# Patient Record
Sex: FEMALE | Race: WHITE | NOT HISPANIC OR LATINO | Employment: OTHER | URBAN - METROPOLITAN AREA
[De-identification: names, ages, dates, MRNs, and addresses within clinical notes are randomized per-mention and may not be internally consistent; named-entity substitution may affect disease eponyms.]

---

## 2023-08-29 PROBLEM — K21.9 GERD (GASTROESOPHAGEAL REFLUX DISEASE): Status: ACTIVE | Noted: 2023-08-29

## 2023-08-29 PROBLEM — K90.0 CELIAC DISEASE (HHS-HCC): Status: ACTIVE | Noted: 2023-08-29

## 2023-08-29 PROBLEM — F32.A DEPRESSIVE DISORDER: Status: ACTIVE | Noted: 2023-08-29

## 2023-08-29 PROBLEM — E06.3 AUTOIMMUNE THYROIDITIS: Status: ACTIVE | Noted: 2023-08-29

## 2023-08-29 PROBLEM — N18.32 CHRONIC KIDNEY DISEASE, STAGE 3B (MULTI): Status: ACTIVE | Noted: 2023-08-29

## 2023-08-29 PROBLEM — M19.90 OSTEOARTHROSIS: Status: ACTIVE | Noted: 2023-08-29

## 2023-08-29 PROBLEM — G62.9 PERIPHERAL NEUROPATHY: Status: ACTIVE | Noted: 2023-08-29

## 2023-08-29 PROBLEM — E10.22 TYPE 1 DIABETES MELLITUS WITH DIABETIC CHRONIC KIDNEY DISEASE (MULTI): Status: ACTIVE | Noted: 2023-08-29

## 2023-08-29 PROBLEM — D64.9 ANEMIA: Status: ACTIVE | Noted: 2023-08-29

## 2023-08-29 PROBLEM — K31.9 DISORDER OF FUNCTION OF STOMACH: Status: ACTIVE | Noted: 2023-08-29

## 2023-08-29 PROBLEM — E03.8 OTHER SPECIFIED HYPOTHYROIDISM: Status: ACTIVE | Noted: 2023-08-29

## 2023-08-29 PROBLEM — K52.9 COLITIS: Status: ACTIVE | Noted: 2023-08-29

## 2023-08-29 PROBLEM — I10 ESSENTIAL HYPERTENSION: Status: ACTIVE | Noted: 2023-08-29

## 2023-08-29 PROBLEM — M71.20 SYNOVIAL CYST OF POPLITEAL SPACE: Status: ACTIVE | Noted: 2023-08-29

## 2023-08-29 PROBLEM — E78.5 HYPERLIPIDEMIA: Status: ACTIVE | Noted: 2023-08-29

## 2023-08-29 PROBLEM — G25.81 RESTLESS LEG SYNDROME: Status: ACTIVE | Noted: 2023-08-29

## 2023-08-29 RX ORDER — INSULIN LISPRO 100 [IU]/ML
30 INJECTION, SOLUTION INTRAVENOUS; SUBCUTANEOUS
COMMUNITY
Start: 2022-02-02 | End: 2024-05-07 | Stop reason: SDUPTHER

## 2023-08-29 RX ORDER — NAPROXEN SODIUM 220 MG
TABLET ORAL DAILY
COMMUNITY
Start: 2020-01-27

## 2023-08-29 RX ORDER — GLUCAGON 3 MG/1
POWDER NASAL AS NEEDED
COMMUNITY
Start: 2021-08-23

## 2023-08-29 RX ORDER — NAPROXEN SODIUM 220 MG/1
81 TABLET, FILM COATED ORAL DAILY
COMMUNITY

## 2023-08-29 RX ORDER — LOSARTAN POTASSIUM 50 MG/1
50 TABLET ORAL DAILY
COMMUNITY

## 2023-08-29 RX ORDER — ROPINIROLE 0.5 MG/1
0.5 TABLET, FILM COATED ORAL NIGHTLY
COMMUNITY

## 2023-08-29 RX ORDER — CITALOPRAM 40 MG/1
40 TABLET, FILM COATED ORAL DAILY
COMMUNITY

## 2023-08-29 RX ORDER — ZOLPIDEM TARTRATE 5 MG/1
2.5 TABLET ORAL NIGHTLY PRN
COMMUNITY
Start: 2016-01-04

## 2023-08-29 RX ORDER — ATORVASTATIN CALCIUM 10 MG/1
10 TABLET, FILM COATED ORAL DAILY
COMMUNITY

## 2023-08-29 RX ORDER — DIPHENHYDRAMINE HCL 25 MG
25 CAPSULE ORAL EVERY 6 HOURS PRN
COMMUNITY

## 2023-08-29 RX ORDER — GABAPENTIN 300 MG/1
1 CAPSULE ORAL NIGHTLY
COMMUNITY

## 2023-08-29 RX ORDER — OXYBUTYNIN CHLORIDE 5 MG/1
5 TABLET ORAL DAILY
COMMUNITY

## 2023-08-29 RX ORDER — INSULIN DEGLUDEC 100 U/ML
15 INJECTION, SOLUTION SUBCUTANEOUS EVERY MORNING
COMMUNITY
End: 2024-05-07 | Stop reason: SDUPTHER

## 2023-08-29 RX ORDER — LEVOTHYROXINE SODIUM 88 UG/1
88 TABLET ORAL
COMMUNITY

## 2023-08-29 RX ORDER — PANTOPRAZOLE SODIUM 40 MG/1
40 TABLET, DELAYED RELEASE ORAL 2 TIMES DAILY PRN
COMMUNITY

## 2023-10-24 ENCOUNTER — PHARMACY VISIT (OUTPATIENT)
Dept: PHARMACY | Facility: CLINIC | Age: 83
End: 2023-10-24
Payer: COMMERCIAL

## 2023-12-23 DIAGNOSIS — E10.22 TYPE 1 DIABETES MELLITUS WITH DIABETIC CHRONIC KIDNEY DISEASE, UNSPECIFIED CKD STAGE (MULTI): Primary | ICD-10-CM

## 2023-12-25 RX ORDER — INSULIN DEGLUDEC 100 U/ML
INJECTION, SOLUTION SUBCUTANEOUS
Qty: 15 ML | Refills: 1 | Status: SHIPPED | OUTPATIENT
Start: 2023-12-25 | End: 2024-12-24

## 2024-01-08 DIAGNOSIS — E10.22 TYPE 1 DIABETES MELLITUS WITH DIABETIC CHRONIC KIDNEY DISEASE, UNSPECIFIED CKD STAGE (MULTI): Primary | ICD-10-CM

## 2024-03-29 DIAGNOSIS — E10.22 TYPE 1 DIABETES MELLITUS WITH DIABETIC CHRONIC KIDNEY DISEASE, UNSPECIFIED CKD STAGE (MULTI): Primary | ICD-10-CM

## 2024-03-29 RX ORDER — INSULIN LISPRO 100 [IU]/ML
INJECTION, SOLUTION INTRAVENOUS; SUBCUTANEOUS
Qty: 15 ML | Refills: 5 | Status: SHIPPED | OUTPATIENT
Start: 2024-03-29 | End: 2025-03-29

## 2024-05-07 DIAGNOSIS — E10.22 TYPE 1 DIABETES MELLITUS WITH DIABETIC CHRONIC KIDNEY DISEASE, UNSPECIFIED CKD STAGE (MULTI): Primary | ICD-10-CM

## 2024-05-07 RX ORDER — INSULIN DEGLUDEC 100 U/ML
9 INJECTION, SOLUTION SUBCUTANEOUS EVERY MORNING
Qty: 18 ML | Refills: 1 | Status: SHIPPED | OUTPATIENT
Start: 2024-05-07

## 2024-05-07 RX ORDER — INSULIN LISPRO 100 [IU]/ML
30 INJECTION, SOLUTION INTRAVENOUS; SUBCUTANEOUS
Qty: 30 ML | Refills: 1 | Status: SHIPPED | OUTPATIENT
Start: 2024-05-07

## 2024-06-04 ENCOUNTER — OFFICE VISIT (OUTPATIENT)
Dept: ENDOCRINOLOGY | Facility: CLINIC | Age: 84
End: 2024-06-04
Payer: COMMERCIAL

## 2024-06-04 VITALS
SYSTOLIC BLOOD PRESSURE: 102 MMHG | HEART RATE: 65 BPM | DIASTOLIC BLOOD PRESSURE: 52 MMHG | WEIGHT: 135 LBS | HEIGHT: 62 IN | BODY MASS INDEX: 24.84 KG/M2

## 2024-06-04 DIAGNOSIS — E78.2 MIXED HYPERLIPIDEMIA: ICD-10-CM

## 2024-06-04 DIAGNOSIS — E06.3 AUTOIMMUNE THYROIDITIS: ICD-10-CM

## 2024-06-04 DIAGNOSIS — E10.22 TYPE 1 DIABETES MELLITUS WITH DIABETIC CHRONIC KIDNEY DISEASE, UNSPECIFIED CKD STAGE (MULTI): Primary | ICD-10-CM

## 2024-06-04 DIAGNOSIS — I10 ESSENTIAL HYPERTENSION: ICD-10-CM

## 2024-06-04 LAB — POC HEMOGLOBIN A1C: 10 % (ref 4.2–6.5)

## 2024-06-04 PROCEDURE — 3074F SYST BP LT 130 MM HG: CPT | Performed by: NURSE PRACTITIONER

## 2024-06-04 PROCEDURE — 3078F DIAST BP <80 MM HG: CPT | Performed by: NURSE PRACTITIONER

## 2024-06-04 PROCEDURE — 83036 HEMOGLOBIN GLYCOSYLATED A1C: CPT | Performed by: NURSE PRACTITIONER

## 2024-06-04 PROCEDURE — 1036F TOBACCO NON-USER: CPT | Performed by: NURSE PRACTITIONER

## 2024-06-04 PROCEDURE — 1159F MED LIST DOCD IN RCRD: CPT | Performed by: NURSE PRACTITIONER

## 2024-06-04 PROCEDURE — 95251 CONT GLUC MNTR ANALYSIS I&R: CPT | Performed by: NURSE PRACTITIONER

## 2024-06-04 PROCEDURE — 99214 OFFICE O/P EST MOD 30 MIN: CPT | Performed by: NURSE PRACTITIONER

## 2024-06-04 PROCEDURE — 1126F AMNT PAIN NOTED NONE PRSNT: CPT | Performed by: NURSE PRACTITIONER

## 2024-06-04 ASSESSMENT — PAIN SCALES - GENERAL: PAINLEVEL: 0-NO PAIN

## 2024-06-04 ASSESSMENT — LIFESTYLE VARIABLES
SKIP TO QUESTIONS 9-10: 1
AUDIT-C TOTAL SCORE: 0
HOW OFTEN DO YOU HAVE SIX OR MORE DRINKS ON ONE OCCASION: NEVER
HOW OFTEN DO YOU HAVE A DRINK CONTAINING ALCOHOL: NEVER
HOW MANY STANDARD DRINKS CONTAINING ALCOHOL DO YOU HAVE ON A TYPICAL DAY: PATIENT DOES NOT DRINK

## 2024-06-04 ASSESSMENT — ENCOUNTER SYMPTOMS
OCCASIONAL FEELINGS OF UNSTEADINESS: 0
LOSS OF SENSATION IN FEET: 0
DEPRESSION: 0

## 2024-06-04 ASSESSMENT — PATIENT HEALTH QUESTIONNAIRE - PHQ9
SUM OF ALL RESPONSES TO PHQ9 QUESTIONS 1 & 2: 0
2. FEELING DOWN, DEPRESSED OR HOPELESS: NOT AT ALL
1. LITTLE INTEREST OR PLEASURE IN DOING THINGS: NOT AT ALL

## 2024-06-04 NOTE — PROGRESS NOTES
HPI   Presents for follow up/metabolic management, 82 yo with DX DM Type 2 diagnosed age 42, TYPE 1 diagnosis 2021. History HTN, HLD, Hypothyroidism, Celiac disease, Neuropathy, CKD (GFR 51 Creatinine 1.09). Current A1C 10% was 9.6 %. Following carb controlled diet somewhat and know reasonable carb allowances. Patient able to afford their medications. Patient is exercising, very active throughout the day.         -CGM Currently on insulin checking BS at least 4-6 xs a day adjustments made based on readings/frequent visits to manage.         -INSULIN Tresiba 9 units daily increased to 10 units Humalog base 5 + ISF 50>150         -HTN Losartan at goal BPs         -STATIN Atorvastatin LDL 68    90 day Cata report downloaded and attached, 36% time in target. no lows. Rollercoaster pattern.        Current Outpatient Medications:     aspirin 81 mg chewable tablet, Chew 1 tablet (81 mg) once daily., Disp: , Rfl:     atorvastatin (Lipitor) 10 mg tablet, Take 1 tablet (10 mg) by mouth once daily., Disp: , Rfl:     morgan cit-mag-D3-Zn--man-bor 250- mg-mg-unit tablet, Take 1 tablet by mouth once daily., Disp: , Rfl:     citalopram (CeleXA) 40 mg tablet, Take 1 tablet (40 mg) by mouth once daily., Disp: , Rfl:     diphenhydrAMINE (BENADryl) 25 mg capsule, Take 1 capsule (25 mg) by mouth every 6 hours if needed for itching., Disp: , Rfl:     FreeStyle Cata sensor system kit, Use as instructed, Disp: 1 each, Rfl: 0    gabapentin (Neurontin) 300 mg capsule, Take 1 capsule (300 mg) by mouth once daily at bedtime., Disp: , Rfl:     glucagon (Baqsimi) 3 mg/actuation spray,non-aerosol, Administer into affected nostril(s) if needed.  as directed as needed for severe low blood sugar Nasally, Disp: , Rfl:     insulin degludec (Tresiba FlexTouch U-100) 100 unit/mL (3 mL) injection, INJECT 9 UNITS SUBCUTANEOUS ONCE DAILY IN THE MORNING, Disp: 15 mL, Rfl: 1    insulin degludec (Tresiba FlexTouch U-100) 100 unit/mL (3 mL) injection,  "Inject 9 Units under the skin once daily in the morning., Disp: 18 mL, Rfl: 1    insulin lispro (HumaLOG KwikPen Insulin) 100 unit/mL injection, INJECT UNDER THE SKIN AS DIRECTED BEFORE MEALS UP TO 30 UNITS DAILY, Disp: 15 mL, Rfl: 5    insulin lispro (HumaLOG KwikPen Insulin) 100 unit/mL injection, Inject 30 Units under the skin 3 times a day with meals.  as directed before meals, Disp: 30 mL, Rfl: 1    insulin syringe-needle U-100 31G X 5/16\" 0.5 mL syringe, Inject under the skin once daily.  Use 3 syringes daily to inject insulin DX: E10.65, Disp: , Rfl:     levothyroxine (Synthroid, Levoxyl) 88 mcg tablet, Take 1 tablet (88 mcg) by mouth once daily in the morning. Take before meals. on an empty stomach, Disp: , Rfl:     losartan (Cozaar) 50 mg tablet, Take 1 tablet (50 mg) by mouth once daily., Disp: , Rfl:     MULTIVITAMIN ORAL, Take by mouth once daily. As directed, Disp: , Rfl:     oxybutynin (Ditropan) 5 mg tablet, Take 1 tablet (5 mg) by mouth once daily., Disp: , Rfl:     pantoprazole (Protonix) 40 mg EC tablet, Take 1 tablet (40 mg) by mouth 2 times a day as needed., Disp: , Rfl:     rOPINIRole (Requip) 0.5 mg tablet, Take 1 tablet (0.5 mg) by mouth once daily at bedtime., Disp: , Rfl:     zolpidem (Ambien) 5 mg tablet, Take 0.5 tablets (2.5 mg) by mouth as needed at bedtime for sleep., Disp: , Rfl:       Allergies as of 06/04/2024 - Reviewed 06/04/2024   Allergen Reaction Noted    Lisinopril Cough 08/29/2023    Metformin Other 08/29/2023    Simvastatin Other 08/29/2023         Review of Systems   Cardiology: Lightheadedness-denies.  Chest pain-denies.  Leg edema-denies.  Palpitations-denies.  Respiratory: Cough-denies. Shortness of breath-denies.  Wheezing-denies.  Gastroenterology: Constipation-denies.  Diarrhea-denies.  Heartburn-denies.  Endocrinology: Cold intolerance-denies.  Heat intolerance-denies.  Sweats-denies.  Neurology: Headache-denies.  Tremor-denies.  Neuropathy in " "extremities-denies.  Psychology: Low energy-denies.  Irritability-denies.  Sleep disturbances-denies.      /52   Pulse 65   Ht 1.575 m (5' 2\")   Wt 61.2 kg (135 lb)   BMI 24.69 kg/m²       Labs:  [facility] viewed through care everywhere this visit    Lab Results   Component Value Date    HGBA1C 10.0 (A) 06/04/2024         Physical Exam   General Appearance: pleasant, cooperative, no acute distress  HEENT: no chemosis, no proptosis, no lid lag, no lid retraction  Neck: no lymphadenopathy, no thyromegaly, no dominant thyroid nodules  Heart: no murmurs, regular rate and rhythm, S1 and S2  Lungs: no wheezes, no rhonci, no rales  Extremities: no lower extremity swelling      Assessment/Plan   1. Type 1 diabetes mellitus with diabetic chronic kidney disease, unspecified CKD stage (Multi)  -uncontrolled Type 1 Diabetes,  has been chronically ill for past year  -adjusted basal and bolus dosing, given new scale  INCREASE THE TRESIBA TO 10 UNITS (11 UNITS IF PEN ONLY ALLOWS 2 UNIT DOSING)   Humalog 5 units BEFORE meals to prevent high blood sugars after meal  Scan BEFORE meal  Blood Sugar.................Units  .........................5 units   151-200......................6 units  201-250......................7 units  251-300.......................8 units  301-350.......................9 units  351-400......................10 units  401 or greater..........11 units  -not dosing for snacks  -declined seeing diabetic educator  -reviewed target BS    - POCT glycosylated hemoglobin (Hb A1C) manually resulted    Patient has been using a blood glucose monitor and performing frequent testing, insulin treated with multiple daily injections and the treatment regime requires frequent adjustment based on blood sugars.   The CGM device is/will be used to lower the risk of hypoglycemia.  The CGM device is/will be used to adjust insulin dosing based on glucose data, food intake, activity and glucose trends  Patient is " adherent to diabetic treatment plan and participates in ongoing education and follow up.  Patient is motivated and knowledgeable about use of continuous glucose monitor.     2. Mixed hyperlipidemia  -tolerates statin    3. Essential hypertension  -at target    4. Autoimmune thyroiditis  -euthroid  -no compressive/obstructive neck complaints       Follow Up: 2 months CNP    -labs/tests/notes reviewed  -reviewed and counseled patient on medication monitoring and side effects    Medical Decision Making  Complexity of problem: Chronic illness of diabetes mellitus uncontrolled, progressing  Data analyzed and reviewed: Reviewed prior notes, blood glucose data, labs including HgbA1c, lipids, serum chemistries.  Ordered tests.   Risk of complications and morbidities: Is definite because of use of insulin and risk of hypoglycemia.  Prescription medications reviewed and modifications made.  Compliance assessed.  Addressed social determinants of health including food insecurity.

## 2024-06-04 NOTE — PATIENT INSTRUCTIONS
Increase long acting insulin by 2 units every 3 days for morning blood sugars around 130 or less  Decrease by 2 units every 3 days for morning blood sugars less than 100     INCREASE THE TRESIBA TO 10 UNITS (11 UNITS IF PEN ONLY ALLOWS 2 UNIT DOSING)     Humalog 5 units BEFORE meals to prevent high blood sugars after meal    Add Correction before meals    Scan BEFORE meal  Blood Sugar.................Units  .........................5 units   151-200......................6 units  201-250......................7 units  251-300.......................8 units  301-350.......................9 units  351-400......................10 units  401 or greater..........11 units

## 2024-07-15 ENCOUNTER — TELEPHONE (OUTPATIENT)
Dept: ENDOCRINOLOGY | Facility: CLINIC | Age: 84
End: 2024-07-15
Payer: COMMERCIAL

## 2024-07-15 DIAGNOSIS — E10.22 TYPE 1 DIABETES MELLITUS WITH DIABETIC CHRONIC KIDNEY DISEASE, UNSPECIFIED CKD STAGE (MULTI): ICD-10-CM

## 2024-07-15 NOTE — TELEPHONE ENCOUNTER
Sent patient a letter for her to call back and reschedule her August appointment. (I left two messages)

## 2024-07-19 ENCOUNTER — TELEPHONE (OUTPATIENT)
Dept: ENDOCRINOLOGY | Facility: CLINIC | Age: 84
End: 2024-07-19
Payer: COMMERCIAL

## 2024-07-19 NOTE — TELEPHONE ENCOUNTER
Patient called back to reschedule her appointment from 8/8/2024.      Patient has been rescheduled.     Alea

## 2024-07-23 DIAGNOSIS — E06.3 AUTOIMMUNE THYROIDITIS: Primary | ICD-10-CM

## 2024-07-23 RX ORDER — LEVOTHYROXINE SODIUM 88 UG/1
TABLET ORAL
Qty: 109 TABLET | Refills: 3 | Status: SHIPPED | OUTPATIENT
Start: 2024-07-23

## 2024-08-09 ENCOUNTER — APPOINTMENT (OUTPATIENT)
Dept: ENDOCRINOLOGY | Facility: CLINIC | Age: 84
End: 2024-08-09
Payer: COMMERCIAL

## 2024-09-12 ENCOUNTER — APPOINTMENT (OUTPATIENT)
Dept: ENDOCRINOLOGY | Facility: CLINIC | Age: 84
End: 2024-09-12
Payer: COMMERCIAL

## 2024-09-12 VITALS
SYSTOLIC BLOOD PRESSURE: 121 MMHG | WEIGHT: 136.6 LBS | HEART RATE: 65 BPM | DIASTOLIC BLOOD PRESSURE: 65 MMHG | BODY MASS INDEX: 24.98 KG/M2

## 2024-09-12 DIAGNOSIS — E06.3 AUTOIMMUNE THYROIDITIS: ICD-10-CM

## 2024-09-12 DIAGNOSIS — E10.22 TYPE 1 DIABETES MELLITUS WITH DIABETIC CHRONIC KIDNEY DISEASE, UNSPECIFIED CKD STAGE (MULTI): Primary | ICD-10-CM

## 2024-09-12 DIAGNOSIS — E78.2 MIXED HYPERLIPIDEMIA: ICD-10-CM

## 2024-09-12 DIAGNOSIS — I10 ESSENTIAL HYPERTENSION: ICD-10-CM

## 2024-09-12 LAB — POC HEMOGLOBIN A1C: 10.3 % (ref 4.2–6.5)

## 2024-09-12 PROCEDURE — 3078F DIAST BP <80 MM HG: CPT | Performed by: NURSE PRACTITIONER

## 2024-09-12 PROCEDURE — 99213 OFFICE O/P EST LOW 20 MIN: CPT | Performed by: NURSE PRACTITIONER

## 2024-09-12 PROCEDURE — 3074F SYST BP LT 130 MM HG: CPT | Performed by: NURSE PRACTITIONER

## 2024-09-12 PROCEDURE — 83036 HEMOGLOBIN GLYCOSYLATED A1C: CPT | Performed by: NURSE PRACTITIONER

## 2024-09-12 PROCEDURE — 1126F AMNT PAIN NOTED NONE PRSNT: CPT | Performed by: NURSE PRACTITIONER

## 2024-09-12 PROCEDURE — 95251 CONT GLUC MNTR ANALYSIS I&R: CPT | Performed by: NURSE PRACTITIONER

## 2024-09-12 PROCEDURE — 1159F MED LIST DOCD IN RCRD: CPT | Performed by: NURSE PRACTITIONER

## 2024-09-12 RX ORDER — ATORVASTATIN CALCIUM 20 MG/1
20 TABLET, FILM COATED ORAL DAILY
COMMUNITY
Start: 2024-04-05

## 2024-09-12 ASSESSMENT — PAIN SCALES - GENERAL: PAINLEVEL: 0-NO PAIN

## 2024-09-12 ASSESSMENT — ENCOUNTER SYMPTOMS: DEPRESSION: 0

## 2024-09-12 NOTE — PATIENT INSTRUCTIONS
Blood Sugar.................Units  .........................8 units   151-200......................9 units  201-250......................10 units  251-300......................11 units  301-350......................12 units  351-400......................13 units  401 or greater..........14 units

## 2024-09-12 NOTE — PROGRESS NOTES
HPI   Presents for follow up/metabolic management, 85 yo with DX DM Type 2 diagnosed age 42, TYPE 1 diagnosis 2021. History HTN, HLD, Hypothyroidism, Celiac disease, Neuropathy, CKD (GFR 51 Creatinine 1.09). Current A1C 10.3 was 10% Not really following carb controlled diet somewhat and know reasonable carb allowances. Patient able to afford their medications. Patient is exercising, very active throughout the day.         -CGM Currently on insulin checking BS at least 4-6 xs a day adjustments made based on readings/frequent visits to manage.         -INSULIN Tresiba 10 units Humalog base 5 + ISF 50>150         -HTN Losartan at goal BPs         -STATIN Atorvastatin LDL 68    90 day Cata report downloaded and attached, 31% time in target. no lows. Rollercoaster pattern.        Current Outpatient Medications:     atorvastatin (Lipitor) 20 mg tablet, Take 1 tablet (20 mg) by mouth once daily., Disp: , Rfl:     aspirin 81 mg chewable tablet, Chew 1 tablet (81 mg) once daily., Disp: , Rfl:     morgan cit-mag-D3-Zn--man-bor 250- mg-mg-unit tablet, Take 1 tablet by mouth once daily., Disp: , Rfl:     citalopram (CeleXA) 40 mg tablet, Take 1 tablet (40 mg) by mouth once daily., Disp: , Rfl:     diphenhydrAMINE (BENADryl) 25 mg capsule, Take 1 capsule (25 mg) by mouth every 6 hours if needed for itching., Disp: , Rfl:     flash glucose scanning reader misc, Use as instructed, Disp: 1 each, Rfl: 0    FreeStyle Cata sensor system kit, Use as instructed, Disp: 1 each, Rfl: 0    gabapentin (Neurontin) 300 mg capsule, Take 1 capsule (300 mg) by mouth once daily at bedtime., Disp: , Rfl:     glucagon (Baqsimi) 3 mg/actuation spray,non-aerosol, Administer into affected nostril(s) if needed.  as directed as needed for severe low blood sugar Nasally, Disp: , Rfl:     insulin degludec (Tresiba FlexTouch U-100) 100 unit/mL (3 mL) injection, INJECT 9 UNITS SUBCUTANEOUS ONCE DAILY IN THE MORNING, Disp: 15 mL, Rfl: 1    insulin  "degludec (Tresiba FlexTouch U-100) 100 unit/mL (3 mL) injection, Inject 9 Units under the skin once daily in the morning., Disp: 18 mL, Rfl: 1    insulin lispro (HumaLOG KwikPen Insulin) 100 unit/mL injection, INJECT UNDER THE SKIN AS DIRECTED BEFORE MEALS UP TO 30 UNITS DAILY, Disp: 15 mL, Rfl: 5    insulin lispro (HumaLOG KwikPen Insulin) 100 unit/mL injection, Inject 30 Units under the skin 3 times a day with meals.  as directed before meals, Disp: 30 mL, Rfl: 1    insulin syringe-needle U-100 31G X 5/16\" 0.5 mL syringe, Inject under the skin once daily.  Use 3 syringes daily to inject insulin DX: E10.65, Disp: , Rfl:     levothyroxine (Synthroid, Levoxyl) 88 mcg tablet, TAKE 1 TABLET BY MOUTH DAILY AND 1 AND 1/2 TABLETS BY MOUTH ON  SUNDAYS, Disp: 109 tablet, Rfl: 3    losartan (Cozaar) 50 mg tablet, Take 1 tablet (50 mg) by mouth once daily., Disp: , Rfl:     MULTIVITAMIN ORAL, Take by mouth once daily. As directed, Disp: , Rfl:     oxybutynin (Ditropan) 5 mg tablet, Take 1 tablet (5 mg) by mouth once daily., Disp: , Rfl:     pantoprazole (Protonix) 40 mg EC tablet, Take 1 tablet (40 mg) by mouth 2 times a day as needed., Disp: , Rfl:     rOPINIRole (Requip) 0.5 mg tablet, Take 1 tablet (0.5 mg) by mouth once daily at bedtime., Disp: , Rfl:     zolpidem (Ambien) 5 mg tablet, Take 0.5 tablets (2.5 mg) by mouth as needed at bedtime for sleep., Disp: , Rfl:       Allergies as of 09/12/2024 - Reviewed 09/12/2024   Allergen Reaction Noted    Lisinopril Cough 08/29/2023    Metformin Other and GI Upset 05/16/2016    Simvastatin Other and Myalgia 05/16/2016         Review of Systems   Cardiology: Lightheadedness-denies.  Chest pain-denies.  Leg edema-denies.  Palpitations-denies.  Respiratory: Cough-denies. Shortness of breath-denies.  Wheezing-denies.  Gastroenterology: Constipation-denies.  Diarrhea-denies.  Heartburn-denies.  Endocrinology: Cold intolerance-denies.  Heat intolerance-denies.  " Sweats-denies.  Neurology: Headache-denies.  Tremor-denies.  Neuropathy in extremities-denies.  Psychology: Low energy-denies.  Irritability-denies.  Sleep disturbances-denies.      /65 (BP Location: Left arm, Patient Position: Sitting, BP Cuff Size: Adult)   Pulse 65   Wt 62 kg (136 lb 9.6 oz)   LMP  (LMP Unknown)   BMI 24.98 kg/m²       Labs:  [facility] viewed through care everywhere this visit    Lab Results   Component Value Date    HGBA1C 10.3 (A) 09/12/2024         Physical Exam   General Appearance: pleasant, cooperative, no acute distress  HEENT: no chemosis, no proptosis, no lid lag, no lid retraction  Neck: no lymphadenopathy, no thyromegaly, no dominant thyroid nodules  Heart: no murmurs, regular rate and rhythm, S1 and S2  Lungs: no wheezes, no rhonci, no rales  Extremities: no lower extremity swelling      Assessment/Plan   1. Type 1 diabetes mellitus with diabetic chronic kidney disease, unspecified CKD stage (Multi)  -uncontrolled Type 1 Diabetes,  has been chronically ill for past year, passed away in June 2024 (missed follow up appointments)  -reinforced pre-bolusing  -reviewed target BS  -reviewed carbs (she is not carb counting)  -ISF 50>150 +1 base of 8  -meet with educator next    Patient has been using a blood glucose monitor and performing frequent testing, insulin treated with multiple daily injections and the treatment regime requires frequent adjustment based on blood sugars.   The CGM device is/will be used to lower the risk of hypoglycemia.  The CGM device is/will be used to adjust insulin dosing based on glucose data, food intake, activity and glucose trends  Patient is adherent to diabetic treatment plan and participates in ongoing education and follow up.  Patient is motivated and knowledgeable about use of continuous glucose monitor.     2. Mixed hyperlipidemia  -tolerates statin    3. Essential hypertension  -at target    4. Autoimmune thyroiditis  -euthroid  -no  compressive/obstructive neck complaints       Follow Up: 2 months     -labs/tests/notes reviewed  -reviewed and counseled patient on medication monitoring and side effects    Medical Decision Making  Complexity of problem: Chronic illness of diabetes mellitus uncontrolled, progressing  Data analyzed and reviewed: Reviewed prior notes, blood glucose data, labs including HgbA1c, lipids, serum chemistries.  Ordered tests.   Risk of complications and morbidities: Is definite because of use of insulin and risk of hypoglycemia.  Prescription medications reviewed and modifications made.  Compliance assessed.  Addressed social determinants of health including food insecurity.

## 2024-12-05 ENCOUNTER — TELEPHONE (OUTPATIENT)
Dept: ENDOCRINOLOGY | Facility: CLINIC | Age: 84
End: 2024-12-05

## 2024-12-05 ENCOUNTER — APPOINTMENT (OUTPATIENT)
Dept: ENDOCRINOLOGY | Facility: CLINIC | Age: 84
End: 2024-12-05
Payer: COMMERCIAL

## 2024-12-05 NOTE — TELEPHONE ENCOUNTER
Attempted to talk to Court to let her know that Erika was not going to be in today ;therefore her appt is cancelled for today 12/05/2024. However, her mailbox was full.

## 2024-12-11 DIAGNOSIS — E10.22 TYPE 1 DIABETES MELLITUS WITH DIABETIC CHRONIC KIDNEY DISEASE, UNSPECIFIED CKD STAGE: ICD-10-CM

## 2024-12-11 RX ORDER — INSULIN LISPRO 100 [IU]/ML
INJECTION, SOLUTION INTRAVENOUS; SUBCUTANEOUS
Qty: 15 ML | Refills: 5 | Status: SHIPPED | OUTPATIENT
Start: 2024-12-11 | End: 2025-12-11

## 2024-12-17 ENCOUNTER — TELEPHONE (OUTPATIENT)
Dept: ENDOCRINOLOGY | Facility: CLINIC | Age: 84
End: 2024-12-17

## 2024-12-17 ENCOUNTER — APPOINTMENT (OUTPATIENT)
Dept: ENDOCRINOLOGY | Facility: CLINIC | Age: 84
End: 2024-12-17
Payer: COMMERCIAL

## 2025-01-27 DIAGNOSIS — E10.22 TYPE 1 DIABETES MELLITUS WITH DIABETIC CHRONIC KIDNEY DISEASE, UNSPECIFIED CKD STAGE: ICD-10-CM

## 2025-01-27 RX ORDER — INSULIN DEGLUDEC 100 U/ML
9 INJECTION, SOLUTION SUBCUTANEOUS EVERY MORNING
Qty: 18 ML | Refills: 1 | Status: SHIPPED | OUTPATIENT
Start: 2025-01-27

## 2025-02-13 ENCOUNTER — APPOINTMENT (OUTPATIENT)
Dept: ENDOCRINOLOGY | Facility: CLINIC | Age: 85
End: 2025-02-13
Payer: COMMERCIAL

## 2025-02-20 ENCOUNTER — APPOINTMENT (OUTPATIENT)
Dept: ENDOCRINOLOGY | Facility: CLINIC | Age: 85
End: 2025-02-20
Payer: COMMERCIAL

## 2025-02-20 VITALS
HEIGHT: 63 IN | HEART RATE: 64 BPM | WEIGHT: 141 LBS | SYSTOLIC BLOOD PRESSURE: 112 MMHG | DIASTOLIC BLOOD PRESSURE: 62 MMHG | BODY MASS INDEX: 24.98 KG/M2

## 2025-02-20 DIAGNOSIS — E10.22 TYPE 1 DIABETES MELLITUS WITH DIABETIC CHRONIC KIDNEY DISEASE, UNSPECIFIED CKD STAGE: Primary | ICD-10-CM

## 2025-02-20 DIAGNOSIS — E78.2 MIXED HYPERLIPIDEMIA: ICD-10-CM

## 2025-02-20 DIAGNOSIS — I10 ESSENTIAL HYPERTENSION: ICD-10-CM

## 2025-02-20 DIAGNOSIS — E06.3 AUTOIMMUNE THYROIDITIS: ICD-10-CM

## 2025-02-20 LAB — POC HEMOGLOBIN A1C: 9.4 % (ref 4.2–6.5)

## 2025-02-20 PROCEDURE — 99214 OFFICE O/P EST MOD 30 MIN: CPT | Performed by: INTERNAL MEDICINE

## 2025-02-20 PROCEDURE — 95251 CONT GLUC MNTR ANALYSIS I&R: CPT | Performed by: INTERNAL MEDICINE

## 2025-02-20 PROCEDURE — 83036 HEMOGLOBIN GLYCOSYLATED A1C: CPT | Performed by: INTERNAL MEDICINE

## 2025-02-20 NOTE — PROGRESS NOTES
Subjective   Patient ID: Court Singh is a 84 y.o. female who presents for Diabetes.  HPI  85 yo with DX DM Type 1 (diagnosed type 2 age 42, TYPE 1 diagnosis 2021), HTN, HLD, Hypothyroidism, Celiac disease, Neuropathy, CKD (GFR 51 Creatinine 1.09). Presents for follow up.   Last A1c-10.3%, today 9.4%.     Testing sugars 4+ times a day with Cata 2 reader. Having low sugars <1-2 times a week. Not really following carb controlled diet and somewhat knows reasonable carb allowances. Pt reports high carb breakfast and lunch, more balanced dinner. Patient able to afford their medications. Patient is exercising, very active throughout the day.    Taking Tresiba 10 units once daily and Humalog 8 units with meals plus ISF 50 > 150.     30 day Cata report downloaded and attached - 27% time in target, 0% lows, avg glucose 242, patterns: upper 100's overnight, mid 200's or higher on waking, sugars 300+ after breakfast, 200-300 around lunch, upper 100's before dinner, mid 200's after dinner and before bed.     Taking losartan 50mg daily for htn.   Taking atorvastatin 20mg daily for lipids.   - LDL 68  Taking levothyroxine 88mcg daily.     Reviewed labs from January 2025: CBC, CMP, TSH, urine microalbumin - all unremarkable.     Current Outpatient Medications:     aspirin 81 mg chewable tablet, Chew 1 tablet (81 mg) once daily., Disp: , Rfl:     atorvastatin (Lipitor) 20 mg tablet, Take 1 tablet (20 mg) by mouth once daily., Disp: , Rfl:     morgan cit-mag-D3-Zn--man-bor 250- mg-mg-unit tablet, Take 1 tablet by mouth once daily., Disp: , Rfl:     citalopram (CeleXA) 40 mg tablet, Take 1 tablet (40 mg) by mouth once daily., Disp: , Rfl:     diphenhydrAMINE (BENADryl) 25 mg capsule, Take 1 capsule (25 mg) by mouth every 6 hours if needed for itching., Disp: , Rfl:     flash glucose scanning reader misc, Use as instructed, Disp: 1 each, Rfl: 0    FreeStyle Cata sensor system kit, Use as instructed, Disp: 1 each, Rfl: 0     "gabapentin (Neurontin) 300 mg capsule, Take 1 capsule (300 mg) by mouth once daily at bedtime., Disp: , Rfl:     glucagon (Baqsimi) 3 mg/actuation spray,non-aerosol, Administer into affected nostril(s) if needed.  as directed as needed for severe low blood sugar Nasally, Disp: , Rfl:     insulin degludec (Tresiba FlexTouch U-100) 100 unit/mL (3 mL) injection, INJECT 9 UNITS SUBCUTANEOUS ONCE DAILY IN THE MORNING, Disp: 15 mL, Rfl: 1    insulin degludec (Tresiba FlexTouch U-100) 100 unit/mL (3 mL) injection, Inject 9 Units under the skin once daily in the morning., Disp: 18 mL, Rfl: 1    insulin lispro (HumaLOG KwikPen Insulin) 100 unit/mL injection, Inject 30 Units under the skin 3 times a day with meals.  as directed before meals, Disp: 30 mL, Rfl: 1    insulin lispro (HumaLOG KwikPen Insulin) 100 unit/mL injection, INJECT UNDER THE SKIN AS DIRECTED BEFORE MEALS UP TO 30 UNITS DAILY, Disp: 15 mL, Rfl: 5    insulin syringe-needle U-100 31G X 5/16\" 0.5 mL syringe, Inject under the skin once daily.  Use 3 syringes daily to inject insulin DX: E10.65, Disp: , Rfl:     levothyroxine (Synthroid, Levoxyl) 88 mcg tablet, TAKE 1 TABLET BY MOUTH DAILY AND 1 AND 1/2 TABLETS BY MOUTH ON  SUNDAYS, Disp: 109 tablet, Rfl: 3    losartan (Cozaar) 50 mg tablet, Take 1 tablet (50 mg) by mouth once daily., Disp: , Rfl:     MULTIVITAMIN ORAL, Take by mouth once daily. As directed, Disp: , Rfl:     oxybutynin (Ditropan) 5 mg tablet, Take 1 tablet (5 mg) by mouth once daily., Disp: , Rfl:     pantoprazole (Protonix) 40 mg EC tablet, Take 1 tablet (40 mg) by mouth 2 times a day as needed., Disp: , Rfl:     rOPINIRole (Requip) 0.5 mg tablet, Take 1 tablet (0.5 mg) by mouth once daily at bedtime., Disp: , Rfl:     zolpidem (Ambien) 5 mg tablet, Take 0.5 tablets (2.5 mg) by mouth as needed at bedtime for sleep., Disp: , Rfl:    Allergies   Allergen Reactions    Lisinopril Cough    Metformin Other and GI Upset     stomach upset    Simvastatin " "Other and Myalgia     myalgias       Review of Systems  See HPI.     Objective   /62   Pulse 64   Ht 1.6 m (5' 3\")   Wt 64 kg (141 lb)   LMP  (LMP Unknown)   BMI 24.98 kg/m²     Labs:   Lab Results   Component Value Date    HGBA1C 9.4 (A) 02/20/2025     Lab Results   Component Value Date    CALCIUM 9.2 06/01/2020    AST 27 04/24/2019    ALKPHOS 94 04/24/2019    BILITOT 0.3 04/24/2019    PROT 6.6 04/24/2019    ALBUMIN 3.7 04/24/2019    GLOB 2.9 04/24/2019    AGR 1.3 (L) 04/24/2019     06/01/2020    K 4.9 06/01/2020     06/01/2020    CO2 24 06/01/2020    ANIONGAP 10 06/01/2020    BUN 16 06/01/2020    CREATININE 1.0 06/01/2020    UREACREAUR 16.0 06/01/2020    GLUCOSE 161 (H) 06/01/2020    ALT 19 04/24/2019    EGFR 57 06/01/2020     Lab Results   Component Value Date    WBC 6.1 04/24/2019    NRBC 0 04/24/2019    RBC 4.20 04/24/2019    HGB 13.0 04/24/2019    HCT 40.0 04/24/2019     04/24/2019     No results found for: \"CHOL\", \"TRIG\", \"HDL\", \"LDLCALC\", \"LDLDIRECT\"  Lab Results   Component Value Date    CREATU 79.7 06/01/2020    MICROALBCREA 15.1 06/01/2020     Lab Results   Component Value Date    TSH 2.59 06/23/2023     No results found for: \"ZWAHFPRM43\"  No results found for: \"VITD25\"  No results found for: \"PTH\"  No results found for: \"MG\"    Assessment    1. Type 1 diabetes mellitus with diabetic chronic kidney disease, unspecified CKD stage    2. Autoimmune thyroiditis    3. Essential hypertension    4. Mixed hyperlipidemia        Medical Decision Making  Complexity of problem: Chronic illness of diabetes mellitus uncontrolled, progressing  Data analyzed and reviewed: Reviewed prior notes, blood glucose data, labs including HgbA1c, lipids, serum chemistries.  Ordered tests.   Risk of complications and morbidities: Is definite because of use of insulin and risk of hypoglycemia.  Prescription medications reviewed and modifications made.  Compliance assessed.  Addressed social determinants " of health including food insecurity.    Plan   1. Type 1 diabetes mellitus with diabetic chronic kidney disease, unspecified CKD stage (Primary)  - POCT glycosylated hemoglobin (Hb A1C) manually resulted    -A1c ordered and reviewed.   -CGM data downloaded and reviewed.   -Labs reviewed.     -A1c remains elevated - patient struggling following carb controlled diet, especially with breakfast.   -Challenging to get tight control without carb counting and insulin to carb ratio.   -Discussed possibility of insulin pump to get better control - at this stage of life likely not practical for patient but offered as an option, pt declined.   -Will increase Humalog base with breakfast and dinner - 10-12u with breakfast, 8u with lunch, and 10u with dinner, plus ISF 50 > 150.   -New scale printed for pt today.   -Continue Tresiba 10 units daily.   -Work on getting more protein with breakfast - resources provided today for low carb, higher protein foods.     2. Autoimmune thyroiditis  -Euthyroid.   -Continue current therapy.     3. Essential hypertension  -BP at target today.   -Continue current therapy.     4. Mixed hyperlipidemia  -On statin and tolerating.   -LDL at target.   -Continue current therapy.         -labs/tests/notes reviewed  -reviewed and counseled patient on medication monitoring and side effects    Follow Up: 6 months Dr. Gross     Treatment and plan discussed with Dr. James Gross.   MARCELO Anaya, PharmD, BCACP, CDCES.

## 2025-02-20 NOTE — PATIENT INSTRUCTIONS
Your A1c was 9.4% today.     Continue Tresiba 10 units once daily.     INCREASE your Humalog with meals following the new scale provided today.   -Breakfast starts with 10-12 units, lunch starts with 8 units, and dinner starts with 10 units.     Work on limiting carbs and incorporating more protein into your breakfast meal - meat, eggs, cottage cheese, yogurt, etc.     Follow up with Dr. Gross in 6 months.

## 2025-02-20 NOTE — PROGRESS NOTES
Attestation signed by James Gross MD on 2/20/25 at 9:27 AM.    I, Dr James Gross, have reviewed this progress note, medication list, vital signs, any pertinent lab values, and any CGM data if present with the [facility] and  face to face during this visit today. This note reflects the treatment plan that was made under my direction after reviewing the above mentioned elements while face to face with the patient and CDE.  I personally answered and addressed any questions and concerns the patient had during the visit today.  The CDE entered the data in this note under my direction and I personally reviewed it, signed any lab or medication orders that I instructed to be completed. I am the billing provider for this visit and the level of service was determined by my involvement in the Medical Decision Making Component of this visit while face to face with the patient.

## 2025-05-13 ENCOUNTER — TELEPHONE (OUTPATIENT)
Facility: CLINIC | Age: 85
End: 2025-05-13
Payer: COMMERCIAL

## 2025-05-20 ENCOUNTER — CLINICAL SUPPORT (OUTPATIENT)
Facility: CLINIC | Age: 85
End: 2025-05-20
Payer: MEDICARE

## 2025-05-20 VITALS
SYSTOLIC BLOOD PRESSURE: 129 MMHG | DIASTOLIC BLOOD PRESSURE: 54 MMHG | BODY MASS INDEX: 25.69 KG/M2 | HEART RATE: 72 BPM | WEIGHT: 145 LBS

## 2025-05-20 DIAGNOSIS — I10 ESSENTIAL HYPERTENSION: ICD-10-CM

## 2025-05-20 DIAGNOSIS — E78.2 MIXED HYPERLIPIDEMIA: ICD-10-CM

## 2025-05-20 DIAGNOSIS — E10.22 TYPE 1 DIABETES MELLITUS WITH DIABETIC CHRONIC KIDNEY DISEASE, UNSPECIFIED CKD STAGE: Primary | ICD-10-CM

## 2025-05-20 PROCEDURE — 99214 OFFICE O/P EST MOD 30 MIN: CPT | Performed by: INTERNAL MEDICINE

## 2025-05-20 PROCEDURE — 99214 OFFICE O/P EST MOD 30 MIN: CPT | Performed by: PHARMACIST

## 2025-05-20 PROCEDURE — 95251 CONT GLUC MNTR ANALYSIS I&R: CPT | Performed by: INTERNAL MEDICINE

## 2025-05-20 NOTE — PATIENT INSTRUCTIONS
Try to limit carbs in meals 45g/meal, 15g/snack   Can try frozen meals - Lean Cuisine, Healthy Choice etc.     Avoid cereal in the morning,  IF having cereal increase Humalog to base 15 units + sliding scale as needed     Get Erika a copy of most recent Social Security Benefit Statement for application to  Patient Assistance Program     Scan ketty often, learning what works vs doesn't work for sugars

## 2025-05-20 NOTE — PROGRESS NOTES
HPI     85 yo with DX DM Type 1 (diagnosed type 2 age 42, TYPE 1 diagnosis 2021), HTN, HLD, Hypothyroidism, Celiac disease, Neuropathy, CKD (GFR 51 Creatinine 1.09). Presents for follow up. Last A1c-9.4%, this month 9.8%.     Testing sugars 4+ times a day with Cata 2 reader. Having low sugars <1-2 times a week. Not really following carb controlled diet and somewhat knows reasonable carb allowances. Pt reports high carb breakfast and lunch, more balanced dinner. Patient able to afford their medications. Patient is exercising, very active throughout the day.    Taking Tresiba 10 units once daily and Humalog (10-12)-8-10 with meals plus ISF 50 >150.     30 day Cata report downloaded and attached - 39% time in target, 1% lows, patterns: upper 100's overnight, low 200's or higher on waking, sugars 300+ after breakfast, 200-300 around lunch, upper 100's before dinner, mid 200's after dinner and before bed.     Taking losartan 50mg daily for htn.     Taking atorvastatin 20mg daily for lipids.   - LDL 68    Taking levothyroxine 88mcg daily.     Reviewed labs from January 2025: CBC, CMP, TSH, urine microalbumin - all unremarkable.       Current Medications[1]    Allergies as of 05/20/2025 - Reviewed 05/20/2025   Allergen Reaction Noted    Lisinopril Cough 08/29/2023    Metformin Other and GI Upset 05/16/2016    Simvastatin Other and Myalgia 05/16/2016       /54   Pulse 72   Wt 65.8 kg (145 lb)   LMP  (LMP Unknown)   BMI 25.69 kg/m²     Labs:   Lab Results   Component Value Date    WBC 6.1 04/24/2019    NRBC 0 04/24/2019    RBC 4.20 04/24/2019    HGB 13.0 04/24/2019    HCT 40.0 04/24/2019     04/24/2019     Lab Results   Component Value Date    CALCIUM 9.2 06/01/2020    AST 27 04/24/2019    ALKPHOS 94 04/24/2019    BILITOT 0.3 04/24/2019    PROT 6.6 04/24/2019    ALBUMIN 3.7 04/24/2019    GLOB 2.9 04/24/2019    AGR 1.3 (L) 04/24/2019     06/01/2020    K 4.9 06/01/2020     06/01/2020    CO2 24  "06/01/2020    ANIONGAP 10 06/01/2020    BUN 16 06/01/2020    CREATININE 1.0 06/01/2020    UREACREAUR 16.0 06/01/2020    GLUCOSE 161 (H) 06/01/2020    ALT 19 04/24/2019    EGFR 57 06/01/2020     No results found for: \"CHOL\", \"TRIG\", \"HDL\", \"LDLCALC\"  Lab Results   Component Value Date    MICROALBCREA 15.1 06/01/2020     Lab Results   Component Value Date    TSH 2.59 06/23/2023     No results found for: \"[id number]\"  Lab Results   Component Value Date    HGBA1C 9.8 (H) 05/12/2025         Assessment/Plan   1. Type 1 diabetes mellitus with diabetic chronic kidney disease, unspecified CKD stage (Primary)    -A1c ordered & reviewed  -ccf labs reviewed  -ketty data reviewed, scanned into epic     -comes down to diet, challenging to get tight control without carb counting and insulin to carb ratio  -work on limiting carbs, incorporating more protein especially with breakfast meal (meat/eggs/yogurt/cottage cheese etc.)  -can try frozen meals Lean Cuisine/Healthy Choice as option  -if having cereal incr base to 12-15 units        Patient Assistance Screening (VAF)  Patient verbally reports monthly or yearly income which is less than 400% federal poverty level.  Application for program would be submitted for the following medications: tresiba, humalog  - pt to provide copy of most recent Social Security Benefit Statement to start application process    2. Essential hypertension  -at target on therapy    3. Mixed hyperlipidemia  -on statin, tolerating       Follow up: Aug as previously scheduled     -labs/tests/notes reviewed  -reviewed and counseled patient on medication monitoring and side effects    Treatment and plan discussed with Dr. Gross.  CATA Moore, PharmD, BC-ADM, CDCES.     Medical Decision Making  Complexity of problem: Chronic illness of diabetes mellitus uncontrolled, progressing  Data analyzed and reviewed: Reviewed prior notes, blood glucose data, labs including HgbA1c, lipids, serum chemistries.  Ordered " "tests.   Risk of complications and morbidities: Is definite because of use of insulin and risk of hypoglycemia.  Prescription medications reviewed and modifications made.  Compliance assessed.  Addressed social determinants of health including food insecurity.           [1]   Current Outpatient Medications:     aspirin 81 mg chewable tablet, Chew 1 tablet (81 mg) once daily., Disp: , Rfl:     atorvastatin (Lipitor) 20 mg tablet, Take 1 tablet (20 mg) by mouth once daily., Disp: , Rfl:     morgan cit-mag-D3-Zn--man-bor 250- mg-mg-unit tablet, Take 1 tablet by mouth once daily., Disp: , Rfl:     citalopram (CeleXA) 40 mg tablet, Take 1 tablet (40 mg) by mouth once daily., Disp: , Rfl:     diphenhydrAMINE (BENADryl) 25 mg capsule, Take 1 capsule (25 mg) by mouth every 6 hours if needed for itching., Disp: , Rfl:     flash glucose scanning reader misc, Use as instructed, Disp: 1 each, Rfl: 0    FreeStyle Cata sensor system kit, Use as instructed, Disp: 1 each, Rfl: 0    gabapentin (Neurontin) 300 mg capsule, Take 1 capsule (300 mg) by mouth once daily at bedtime., Disp: , Rfl:     glucagon (Baqsimi) 3 mg/actuation spray,non-aerosol, Administer into affected nostril(s) if needed.  as directed as needed for severe low blood sugar Nasally, Disp: , Rfl:     insulin degludec (Tresiba FlexTouch U-100) 100 unit/mL (3 mL) injection, Inject 9 Units under the skin once daily in the morning., Disp: 18 mL, Rfl: 1    insulin lispro (HumaLOG KwikPen Insulin) 100 unit/mL injection, Inject 30 Units under the skin 3 times a day with meals.  as directed before meals, Disp: 30 mL, Rfl: 1    insulin lispro (HumaLOG KwikPen Insulin) 100 unit/mL injection, INJECT UNDER THE SKIN AS DIRECTED BEFORE MEALS UP TO 30 UNITS DAILY, Disp: 15 mL, Rfl: 5    insulin syringe-needle U-100 31G X 5/16\" 0.5 mL syringe, Inject under the skin once daily.  Use 3 syringes daily to inject insulin DX: E10.65, Disp: , Rfl:     levothyroxine (Synthroid, " Levoxyl) 88 mcg tablet, TAKE 1 TABLET BY MOUTH DAILY AND 1 AND 1/2 TABLETS BY MOUTH ON  SUNDAYS, Disp: 109 tablet, Rfl: 3    losartan (Cozaar) 50 mg tablet, Take 1 tablet (50 mg) by mouth once daily., Disp: , Rfl:     MULTIVITAMIN ORAL, Take by mouth once daily. As directed, Disp: , Rfl:     oxybutynin (Ditropan) 5 mg tablet, Take 1 tablet (5 mg) by mouth once daily., Disp: , Rfl:     pantoprazole (Protonix) 40 mg EC tablet, Take 1 tablet (40 mg) by mouth 2 times a day as needed., Disp: , Rfl:     rOPINIRole (Requip) 0.5 mg tablet, Take 1 tablet (0.5 mg) by mouth once daily at bedtime., Disp: , Rfl:     zolpidem (Ambien) 5 mg tablet, Take 0.5 tablets (2.5 mg) by mouth as needed at bedtime for sleep., Disp: , Rfl:

## 2025-05-20 NOTE — PROGRESS NOTES
Attestation signed by James Gross MD on 5/20/25 at 1:02 PM.    I, Dr James Gross, have reviewed this progress note, medication list, vital signs, any pertinent lab values, and any CGM data if present with the [facility] and  face to face during this visit today. This note reflects the treatment plan that was made under my direction after reviewing the above mentioned elements while face to face with the patient and CDE.  I personally answered and addressed any questions and concerns the patient had during the visit today.  The CDE entered the data in this note under my direction and I personally reviewed it, signed any lab or medication orders that I instructed to be completed. I am the billing provider for this visit and the level of service was determined by my involvement in the Medical Decision Making Component of this visit while face to face with the patient.

## 2025-05-22 ENCOUNTER — TELEMEDICINE (OUTPATIENT)
Dept: PHARMACY | Facility: HOSPITAL | Age: 85
End: 2025-05-22
Payer: MEDICARE

## 2025-05-22 DIAGNOSIS — E10.22 TYPE 1 DIABETES MELLITUS WITH DIABETIC CHRONIC KIDNEY DISEASE, UNSPECIFIED CKD STAGE: ICD-10-CM

## 2025-05-22 RX ORDER — INSULIN DEGLUDEC 100 U/ML
INJECTION, SOLUTION SUBCUTANEOUS
Qty: 15 ML | Refills: 3 | Status: SHIPPED | OUTPATIENT
Start: 2025-05-22

## 2025-05-22 RX ORDER — INSULIN LISPRO 100 [IU]/ML
INJECTION, SOLUTION INTRAVENOUS; SUBCUTANEOUS
Qty: 45 ML | Refills: 3 | Status: SHIPPED | OUTPATIENT
Start: 2025-05-22

## 2025-05-22 NOTE — PROGRESS NOTES
"[facility]      Court Singh a 84 y.o. patient was referred to the Clinical Pharmacy Team for diabetes management.  Presents today via telephone for initial assessment and management.      Referring Provider: James Gross MD     Testing sugars 4+ times a day with Cata 2 reader. Having low sugars <1-2 times a week. Not really following carb controlled diet and somewhat knows reasonable carb allowances. Pt reports high carb breakfast and lunch, more balanced dinner. Patient able to afford their medications. Patient is exercising, very active throughout the day.    Taking Tresiba 10 units once daily and Humalog (10-12)-8-10 with meals plus ISF 50 >150.     30 day Cata report downloaded and attached - 39% time in target, 1% lows, patterns: upper 100's overnight, low 200's or higher on waking, sugars 300+ after breakfast, 200-300 around lunch, upper 100's before dinner, mid 200's after dinner and before bed.     Taking losartan 50mg daily for htn.     Taking atorvastatin 20mg daily for lipids.   - LDL 68    Taking levothyroxine 88mcg daily.     Reviewed labs from January 2025: CBC, CMP, TSH, urine microalbumin - all unremarkable.         Current Medications[1]    Allergies as of 05/22/2025 - Reviewed 05/20/2025   Allergen Reaction Noted    Lisinopril Cough 08/29/2023    Metformin Other and GI Upset 05/16/2016    Simvastatin Other and Myalgia 05/16/2016       Labs:   Lab Results   Component Value Date    CALCIUM 9.2 06/01/2020    AST 27 04/24/2019    ALKPHOS 94 04/24/2019    BILITOT 0.3 04/24/2019    PROT 6.6 04/24/2019    ALBUMIN 3.7 04/24/2019    GLOB 2.9 04/24/2019    AGR 1.3 (L) 04/24/2019     06/01/2020    K 4.9 06/01/2020     06/01/2020    CO2 24 06/01/2020    ANIONGAP 10 06/01/2020    BUN 16 06/01/2020    CREATININE 1.0 06/01/2020    UREACREAUR 16.0 06/01/2020    GLUCOSE 161 (H) 06/01/2020    ALT 19 04/24/2019    EGFR 57 06/01/2020     No results found for: \"CHOL\", \"TRIG\", " "\"HDL\", \"LDLCALC\"  Lab Results   Component Value Date    MICROALBCREA 15.1 06/01/2020     Lab Results   Component Value Date    HGBA1C 9.8 (H) 05/12/2025          Patient Assistance Screening (VAF)  Patient verbally reports monthly or yearly income which is less than 400% federal poverty level.  Application for program has been submitted for the following medications: Tresiba and Humalog  Patient has been informed that program team will be reaching out to them to discuss necessary documentation, instructed to answer phone/return voicemail.   Patient aware this process may take up to 6 weeks.   If approved medication must be filled through [facility] pharmacy and may be picked up or mailed to patient.    PATIENT EDUCATION/DISCUSSION:  - Counseled patient on MOA, expectations, side effects, duration of therapy, contraindications, administration, and monitoring parameters  - Answered all patient questions and concerns    Assessment/Plan   1. Type 1 diabetes mellitus with diabetic chronic kidney disease, unspecified CKD stage      -comes down to diet, challenging to get tight control without carb counting and insulin to carb ratio  -work on limiting carbs, incorporating more protein especially with breakfast meal (meat/eggs/yogurt/cottage cheese etc.)  -can try frozen meals Lean Cuisine/Healthy Choice as option  -if having cereal incr base to 12-15 units       1. Continue all meds under the continuation of care with the referring provider and clinical pharmacy team.  2. Prescription(s) sent to [facility] pharmacy for assistance on authorization and copay. Medication will be mailed to patient.    Follow up as scheduled with Dr. Gross & team in endocrinology office.  Follow up annually with clinical pharmacist for re-enrollment in [facility]    Thank you,   Erika Moore, PharmD, BC-ADM, CDCES     Verbal consent to manage patient's drug therapy was obtained from the patient and/or an individual authorized to act on behalf " "of a patient. They were informed they may decline to participate or withdraw from participation in pharmacy services at any time.       [1]   Current Outpatient Medications:     aspirin 81 mg chewable tablet, Chew 1 tablet (81 mg) once daily., Disp: , Rfl:     atorvastatin (Lipitor) 20 mg tablet, Take 1 tablet (20 mg) by mouth once daily., Disp: , Rfl:     morgan cit-mag-D3-Zn--man-bor 250- mg-mg-unit tablet, Take 1 tablet by mouth once daily., Disp: , Rfl:     citalopram (CeleXA) 40 mg tablet, Take 1 tablet (40 mg) by mouth once daily., Disp: , Rfl:     diphenhydrAMINE (BENADryl) 25 mg capsule, Take 1 capsule (25 mg) by mouth every 6 hours if needed for itching., Disp: , Rfl:     flash glucose scanning reader misc, Use as instructed, Disp: 1 each, Rfl: 0    FreeStyle Cata sensor system kit, Use as instructed, Disp: 1 each, Rfl: 0    gabapentin (Neurontin) 300 mg capsule, Take 1 capsule (300 mg) by mouth once daily at bedtime., Disp: , Rfl:     glucagon (Baqsimi) 3 mg/actuation spray,non-aerosol, Administer into affected nostril(s) if needed.  as directed as needed for severe low blood sugar Nasally, Disp: , Rfl:     insulin degludec (Tresiba FlexTouch U-100) 100 unit/mL (3 mL) injection, Inject 9 Units under the skin once daily in the morning., Disp: 18 mL, Rfl: 1    insulin lispro (HumaLOG KwikPen Insulin) 100 unit/mL injection, Inject 30 Units under the skin 3 times a day with meals.  as directed before meals, Disp: 30 mL, Rfl: 1    insulin lispro (HumaLOG KwikPen Insulin) 100 unit/mL injection, INJECT UNDER THE SKIN AS DIRECTED BEFORE MEALS UP TO 30 UNITS DAILY, Disp: 15 mL, Rfl: 5    insulin syringe-needle U-100 31G X 5/16\" 0.5 mL syringe, Inject under the skin once daily.  Use 3 syringes daily to inject insulin DX: E10.65, Disp: , Rfl:     levothyroxine (Synthroid, Levoxyl) 88 mcg tablet, TAKE 1 TABLET BY MOUTH DAILY AND 1 AND 1/2 TABLETS BY MOUTH ON  SUNDAYS, Disp: 109 tablet, Rfl: 3    losartan " (Cozaar) 50 mg tablet, Take 1 tablet (50 mg) by mouth once daily., Disp: , Rfl:     MULTIVITAMIN ORAL, Take by mouth once daily. As directed, Disp: , Rfl:     oxybutynin (Ditropan) 5 mg tablet, Take 1 tablet (5 mg) by mouth once daily., Disp: , Rfl:     pantoprazole (Protonix) 40 mg EC tablet, Take 1 tablet (40 mg) by mouth 2 times a day as needed., Disp: , Rfl:     rOPINIRole (Requip) 0.5 mg tablet, Take 1 tablet (0.5 mg) by mouth once daily at bedtime., Disp: , Rfl:     zolpidem (Ambien) 5 mg tablet, Take 0.5 tablets (2.5 mg) by mouth as needed at bedtime for sleep., Disp: , Rfl:

## 2025-05-28 PROCEDURE — RXMED WILLOW AMBULATORY MEDICATION CHARGE

## 2025-05-29 ENCOUNTER — PHARMACY VISIT (OUTPATIENT)
Dept: PHARMACY | Facility: CLINIC | Age: 85
End: 2025-05-29
Payer: COMMERCIAL

## 2025-07-23 DIAGNOSIS — E06.3 AUTOIMMUNE THYROIDITIS: ICD-10-CM

## 2025-07-23 RX ORDER — LEVOTHYROXINE SODIUM 88 UG/1
88 TABLET ORAL DAILY
Qty: 90 TABLET | Refills: 3 | Status: SHIPPED | OUTPATIENT
Start: 2025-07-23

## 2025-08-18 DIAGNOSIS — E10.22 TYPE 1 DIABETES MELLITUS WITH DIABETIC CHRONIC KIDNEY DISEASE, UNSPECIFIED CKD STAGE: ICD-10-CM

## 2025-08-18 RX ORDER — INSULIN DEGLUDEC 100 U/ML
INJECTION, SOLUTION SUBCUTANEOUS
Qty: 15 ML | Refills: 3 | Status: SHIPPED | OUTPATIENT
Start: 2025-08-18

## 2025-08-18 RX ORDER — INSULIN LISPRO 100 [IU]/ML
INJECTION, SOLUTION INTRAVENOUS; SUBCUTANEOUS
Qty: 45 ML | Refills: 3 | Status: SHIPPED | OUTPATIENT
Start: 2025-08-18

## 2025-08-19 ENCOUNTER — TELEPHONE (OUTPATIENT)
Facility: CLINIC | Age: 85
End: 2025-08-19
Payer: MEDICARE

## 2025-08-20 ENCOUNTER — APPOINTMENT (OUTPATIENT)
Dept: ENDOCRINOLOGY | Facility: CLINIC | Age: 85
End: 2025-08-20
Payer: COMMERCIAL

## 2025-08-25 ENCOUNTER — TELEPHONE (OUTPATIENT)
Facility: CLINIC | Age: 85
End: 2025-08-25
Payer: MEDICARE

## 2025-08-26 ENCOUNTER — APPOINTMENT (OUTPATIENT)
Facility: CLINIC | Age: 85
End: 2025-08-26
Payer: MEDICARE

## 2025-08-26 VITALS
BODY MASS INDEX: 25.69 KG/M2 | DIASTOLIC BLOOD PRESSURE: 68 MMHG | SYSTOLIC BLOOD PRESSURE: 132 MMHG | WEIGHT: 145 LBS | HEART RATE: 59 BPM

## 2025-08-26 DIAGNOSIS — E78.2 MIXED HYPERLIPIDEMIA: ICD-10-CM

## 2025-08-26 DIAGNOSIS — E06.3 AUTOIMMUNE THYROIDITIS: ICD-10-CM

## 2025-08-26 DIAGNOSIS — I10 ESSENTIAL HYPERTENSION: ICD-10-CM

## 2025-08-26 DIAGNOSIS — E10.22 TYPE 1 DIABETES MELLITUS WITH DIABETIC CHRONIC KIDNEY DISEASE, UNSPECIFIED CKD STAGE: Primary | ICD-10-CM

## 2025-08-26 LAB — POC HEMOGLOBIN A1C: 9.2 % (ref 4.2–6.5)

## 2025-08-26 PROCEDURE — 99214 OFFICE O/P EST MOD 30 MIN: CPT | Performed by: INTERNAL MEDICINE

## 2025-08-26 PROCEDURE — 3078F DIAST BP <80 MM HG: CPT | Performed by: INTERNAL MEDICINE

## 2025-08-26 PROCEDURE — 3075F SYST BP GE 130 - 139MM HG: CPT | Performed by: INTERNAL MEDICINE

## 2025-08-26 PROCEDURE — 83036 HEMOGLOBIN GLYCOSYLATED A1C: CPT | Performed by: INTERNAL MEDICINE

## 2025-08-26 PROCEDURE — 1159F MED LIST DOCD IN RCRD: CPT | Performed by: INTERNAL MEDICINE

## 2025-08-26 PROCEDURE — 1036F TOBACCO NON-USER: CPT | Performed by: INTERNAL MEDICINE

## 2025-08-26 PROCEDURE — 1125F AMNT PAIN NOTED PAIN PRSNT: CPT | Performed by: INTERNAL MEDICINE

## 2025-08-26 RX ORDER — INSULIN DEGLUDEC 100 U/ML
INJECTION, SOLUTION SUBCUTANEOUS
Qty: 15 ML | Refills: 3 | Status: SHIPPED | OUTPATIENT
Start: 2025-08-26

## 2025-08-26 RX ORDER — INSULIN LISPRO 100 [IU]/ML
INJECTION, SOLUTION INTRAVENOUS; SUBCUTANEOUS
Qty: 45 ML | Refills: 3 | Status: SHIPPED | OUTPATIENT
Start: 2025-08-26

## 2025-08-26 ASSESSMENT — ENCOUNTER SYMPTOMS
DEPRESSION: 0
OCCASIONAL FEELINGS OF UNSTEADINESS: 1
LOSS OF SENSATION IN FEET: 0

## 2025-08-26 ASSESSMENT — PAIN SCALES - GENERAL: PAINLEVEL_OUTOF10: 2

## 2025-08-29 ENCOUNTER — TELEPHONE (OUTPATIENT)
Facility: CLINIC | Age: 85
End: 2025-08-29
Payer: MEDICARE

## 2026-02-27 ENCOUNTER — APPOINTMENT (OUTPATIENT)
Facility: CLINIC | Age: 86
End: 2026-02-27
Payer: MEDICARE